# Patient Record
Sex: MALE | Race: ASIAN | NOT HISPANIC OR LATINO | Employment: FULL TIME | URBAN - METROPOLITAN AREA
[De-identification: names, ages, dates, MRNs, and addresses within clinical notes are randomized per-mention and may not be internally consistent; named-entity substitution may affect disease eponyms.]

---

## 2019-01-29 ENCOUNTER — COMMUNICATION - HEALTHEAST (OUTPATIENT)
Dept: SCHEDULING | Facility: CLINIC | Age: 44
End: 2019-01-29

## 2020-11-03 ENCOUNTER — OFFICE VISIT - HEALTHEAST (OUTPATIENT)
Dept: FAMILY MEDICINE | Facility: CLINIC | Age: 45
End: 2020-11-03

## 2020-11-03 DIAGNOSIS — R05.9 COUGH: ICD-10-CM

## 2020-11-03 DIAGNOSIS — U07.1 CLINICAL DIAGNOSIS OF COVID-19: ICD-10-CM

## 2020-11-03 RX ORDER — BENZONATATE 100 MG/1
100 CAPSULE ORAL 3 TIMES DAILY PRN
Qty: 30 CAPSULE | Refills: 0 | Status: SHIPPED | OUTPATIENT
Start: 2020-11-03 | End: 2021-10-11

## 2020-11-06 ENCOUNTER — COMMUNICATION - HEALTHEAST (OUTPATIENT)
Dept: FAMILY MEDICINE | Facility: CLINIC | Age: 45
End: 2020-11-06

## 2021-03-04 ENCOUNTER — OFFICE VISIT - HEALTHEAST (OUTPATIENT)
Dept: FAMILY MEDICINE | Facility: CLINIC | Age: 46
End: 2021-03-04

## 2021-03-04 DIAGNOSIS — M54.50 LUMBAR BACK PAIN: ICD-10-CM

## 2021-03-04 RX ORDER — CELECOXIB 200 MG/1
200 CAPSULE ORAL 2 TIMES DAILY
Qty: 60 CAPSULE | Refills: 2 | Status: SHIPPED | OUTPATIENT
Start: 2021-03-04 | End: 2021-10-11

## 2021-03-05 ENCOUNTER — COMMUNICATION - HEALTHEAST (OUTPATIENT)
Dept: FAMILY MEDICINE | Facility: CLINIC | Age: 46
End: 2021-03-05

## 2021-03-05 ENCOUNTER — COMMUNICATION - HEALTHEAST (OUTPATIENT)
Dept: ADMINISTRATIVE | Facility: CLINIC | Age: 46
End: 2021-03-05

## 2021-06-12 NOTE — PROGRESS NOTES
"Lucy Lee is a 45 y.o. male who is being evaluated via a billable telephone visit.      The patient has been notified of following:     \"This telephone visit will be conducted via a call between you and your physician/provider. We have found that certain health care needs can be provided without the need for a physical exam.  This service lets us provide the care you need with a short phone conversation.  If a prescription is necessary we can send it directly to your pharmacy.  If lab work is needed we can place an order for that and you can then stop by our lab to have the test done at a later time.    Telephone visits are billed at different rates depending on your insurance coverage. During this emergency period, for some insurers they may be billed the same as an in-person visit.  Please reach out to your insurance provider with any questions.    If during the course of the call the physician/provider feels a telephone visit is not appropriate, you will not be charged for this service.\"    Patient has given verbal consent to a Telephone visit? Yes    What phone number would you like to be contacted at? [phone]    Patient would like to receive their AVS by AVS Preference: Mail a copy.    Additional provider notes: 45-year-old male diagnosed with COVID-19.  Has been off work for 8 days and still coughing and feels fatigue would like to go back to work next week.  He notes no nausea no dizziness appetite is normal he has not noticed a fever.  He does cough during the day and at night which disturbs his sleep.  He requires a work note so he can return next week.  He has been isolating himself at home.    Assessment/Plan:  1. Cough    Medication prescribed side effects discussed in detail  - benzonatate (TESSALON PERLES) 100 MG capsule; Take 1 capsule (100 mg total) by mouth 3 (three) times a day as needed for cough.  Dispense: 30 capsule; Refill: 0    2. Clinical diagnosis of COVID-19    Work note will be " given if he develops any shortness of breath or wheezing or diarrhea he needs to go to the emergency room immediately    10:33 AM phone start time  10:51 AM phone end time    Phone call duration: 18  minutes    Carrol OROURKE

## 2021-06-12 NOTE — TELEPHONE ENCOUNTER
Who is requesting the letter?  Lucy king  Why is the letter needed? To return to work, he was told that it was going to be mailed out to him but he never received it and now he needs to have it today.  How would you like this letter returned? He would like to come in today and get it.  Okay to leave a detailed message? Yes, please call them when it is ready for him to  today

## 2021-06-15 NOTE — TELEPHONE ENCOUNTER
Please see below message.  Can CMT ad date of 3/8/21 to letter?  Thanks.     March 4, 2021      Patient: Lucy Lee   YOB: 1975   Date of Visit: 3/4/2021         To Whom It May Concern:     It is my medical opinion that Lucy Lee should remind out until 3/5/202. Please excuse his absences from 3/4/2021-3/5/2021.      If you have any questions or concerns, please don't hesitate to call.     Sincerely,           Electronically signed by Evin Abarca MD

## 2021-06-15 NOTE — TELEPHONE ENCOUNTER
Reason for Call:  Other : return to work letter     Detailed comments: Patient received return to work letter via [employer] but needs additional return date 3/8/21. Can Dr. Abarca add this to the letter? Otherwise, his employer won't let him go back to work. You can send him a [employer] message once letter is update.    Phone Number Patient can be reached at: Other phone number:  [phone]     Best Time: anytime2    Can we leave a detailed message on this number?: Yes    Call taken on 3/5/2021 at 12:53 PM by Ivonne Rick

## 2021-06-15 NOTE — PROGRESS NOTES
Lucy Lee is a 45 y.o. male who is being evaluated via a billable video visit.      How would you like to obtain your AVS? MyChart.  If dropped from the video visit, the video invitation should be resent by: [phone]  Will anyone else be joining your video visit? No      Video Start Time: 1230    Assessment & Plan     Lumbar back pain  Patient can continue with muscle relaxant Celebrex prescribed work note given.  Side effects of medication discussed in detail if symptoms continue follow-up recommended in 3 to 4 weeks.  All test results from ER visit discussed with patient  - celecoxib (CELEBREX) 200 MG capsule  Dispense: 60 capsule; Refill: 2    Evin Abarca MD  [facility]   Lucy Lee is 45 y.o. and presents today for a follow-up of his back pain on the virtual billable video visit.  Patient was seen [facility] for back pain because he is having referred groin pain multiple tests were done for the patient.  He reports that the tenacity and is helping with the back pain but only for a few hours he says his back is stiff in the morning his pain throughout the day.  He says he has not been able to go to work because of this pain.  He denies any abdominal complaints.  He denies any dizziness.  He states that he has not had this pain before.  Review of Systems   Musculoskeletal positive for mid thoracic and lumbar back pain with no radiation      Objective       Vitals:  No vitals were obtained today due to virtual visit.    Physical Exam  Interactive male sitting comfortably in his home in no acute distress  Musculoskeletal system no tenderness elicited when he presses his thoracic spine  He has difficulty flexing his lumbar spine beyond 40 degrees at L3 and L4.        Video-Visit Details    Type of service:  Video Visit    Video End Time (time video stopped): 100  Originating Location (pt. Location): Home    Distant Location (provider location):  [facility]     Platform used for Video Visit: Cesaar Branham MD

## 2021-06-21 NOTE — LETTER
Letter by Evin Abarca MD at      Author: Evin Abarca MD Service: -- Author Type: --    Filed:  Encounter Date: 3/4/2021 Status: (Other)         March 4, 2021     Patient: Lucy Lee   YOB: 1975   Date of Visit: 3/4/2021       To Whom It May Concern:    It is my medical opinion that Lucy Lee should remind out until 3/5/202. Please excuse his absences from 3/4/2021-3/5/2021.     If you have any questions or concerns, please don't hesitate to call.    Sincerely,        Electronically signed by Evin Abarca MD

## 2021-06-21 NOTE — LETTER
Letter by Maricruz Schwab MA at      Author: Maricruz Schwab MA Service: -- Author Type: --    Filed:  Encounter Date: 3/5/2021 Status: (Other)         March 4, 2021      Patient: Lucy Lee   YOB: 1975   Date of Visit: 3/4/2021         To Whom It May Concern:     It is my medical opinion that Lucy Lee should remind out until 3/8/202. Please excuse his absences from 3/4/2021-3/5/2021.      If you have any questions or concerns, please don't hesitate to call.     Sincerely,           Electronically signed by Evin Abarca MD

## 2021-06-21 NOTE — LETTER
Letter by Evin Abarca MD at      Author: Evin Abarca MD Service: -- Author Type: --    Filed:  Encounter Date: 11/3/2020 Status: (Other)         November 3, 2020     Patient: Lucy Lee   YOB: 1975   Date of Visit: 11/3/2020       To Whom It May Concern:    It is my medical opinion that Lucy Lee may return to work on November 9, 2020.  Please excuse his absence from work on October 21, 2020 - November 8, 2020.     If you have any questions or concerns, please don't hesitate to call.    Sincerely,        Electronically signed by Evin Abarca MD
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
## 2021-06-27 ENCOUNTER — HEALTH MAINTENANCE LETTER (OUTPATIENT)
Age: 46
End: 2021-06-27

## 2021-10-11 ENCOUNTER — APPOINTMENT (OUTPATIENT)
Dept: INTERPRETER SERVICES | Facility: CLINIC | Age: 46
End: 2021-10-11
Payer: COMMERCIAL

## 2021-10-11 ENCOUNTER — OFFICE VISIT (OUTPATIENT)
Dept: FAMILY MEDICINE | Facility: CLINIC | Age: 46
End: 2021-10-11
Payer: COMMERCIAL

## 2021-10-11 VITALS
TEMPERATURE: 97.6 F | RESPIRATION RATE: 16 BRPM | WEIGHT: 113.7 LBS | SYSTOLIC BLOOD PRESSURE: 114 MMHG | OXYGEN SATURATION: 96 % | HEART RATE: 61 BPM | DIASTOLIC BLOOD PRESSURE: 71 MMHG

## 2021-10-11 DIAGNOSIS — M62.838 MUSCLE SPASMS OF NECK: Primary | ICD-10-CM

## 2021-10-11 PROCEDURE — 99213 OFFICE O/P EST LOW 20 MIN: CPT | Performed by: NURSE PRACTITIONER

## 2021-10-11 RX ORDER — CYCLOBENZAPRINE HCL 10 MG
10 TABLET ORAL
Qty: 14 TABLET | Refills: 0 | Status: SHIPPED | OUTPATIENT
Start: 2021-10-11

## 2021-10-11 RX ORDER — NAPROXEN 500 MG/1
500 TABLET ORAL 2 TIMES DAILY PRN
Qty: 28 TABLET | Refills: 0 | Status: SHIPPED | OUTPATIENT
Start: 2021-10-11 | End: 2021-10-25

## 2021-10-11 ASSESSMENT — ENCOUNTER SYMPTOMS
FEVER: 0
PARESTHESIAS: 0
HEADACHES: 0
CHILLS: 0
NUMBNESS: 0
DIZZINESS: 0

## 2021-10-11 NOTE — PATIENT INSTRUCTIONS
No ibuprofen. Tylenol ok.      Naproxen twice daily for pain     Warm pack to the neck up to 1 time per hour.  Warm wet towel or heating paid.      Flexeril for muscle spasm at night - will make him sleepy.      Come back if weakness or not better and having difficulty working as a result in 1 week-10 days.

## 2021-10-11 NOTE — PROGRESS NOTES
Assessment & Plan     Muscle spasms of neck    - naproxen (NAPROSYN) 500 MG tablet  Dispense: 28 tablet; Refill: 0  - cyclobenzaprine (FLEXERIL) 10 MG tablet  Dispense: 14 tablet; Refill: 0     Point tenderness to left lateral neck.  Symptoms starting to improve since being 3 days ago.  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  Work note.  Warm packs.  Above medications for 14 days.  Recheck if not better in about a week, sooner if weakness, paresthesias, radiculopathy discussed.            Return in about 1 week (around 10/18/2021) for If no better.    Linda Ibarra, CNP  [facility]    Bing Betancourt is a 46 year old male who presents to clinic today for the following health issues:  Chief Complaint   Patient presents with     Neck Pain     daughter started massaging neck on saturday causing muscle spasms, having bad neck pain ever since      HPI    3 days ago.  Turned around to grab something and suddenly started hurting.      Left sided lateral neck pain pain.  Was unable to turn head yesterday. A little better today.      No weakness.  No radiculopathy.      Works driving a car. Couldn't drive due to inability to look over shoulder.      Pain 5/10.        Review of Systems   Constitutional: Negative for chills and fever.   Neurological: Negative for dizziness, numbness, headaches and paresthesias.           Objective    /71 (BP Location: Right arm, Patient Position: Sitting, Cuff Size: Adult Regular)   Pulse 61   Temp 97.6  F (36.4  C) (Oral)   Resp 16   Wt 51.6 kg (113 lb 11.2 oz)   SpO2 96%   Physical Exam  Constitutional:       Appearance: Normal appearance.   Neck:      Comments: Mildly reduced ROM in all directions   Musculoskeletal:      Cervical back: Tenderness (point tenderness base of skull on left ) present.   Neurological:      Mental Status: He is alert.   Psychiatric:         Mood and Affect: Mood normal.          Behavior: Behavior normal.         Thought Content: Thought content normal.         Judgment: Judgment normal.

## 2021-10-17 ENCOUNTER — HEALTH MAINTENANCE LETTER (OUTPATIENT)
Age: 46
End: 2021-10-17

## 2021-12-28 ENCOUNTER — IMMUNIZATION (OUTPATIENT)
Dept: NURSING | Facility: CLINIC | Age: 46
End: 2021-12-28
Payer: COMMERCIAL

## 2021-12-28 PROCEDURE — 91306 COVID-19,PF,MODERNA (18+ YRS BOOSTER .25ML): CPT

## 2021-12-28 PROCEDURE — 0064A COVID-19,PF,MODERNA (18+ YRS BOOSTER .25ML): CPT

## 2022-07-24 ENCOUNTER — HEALTH MAINTENANCE LETTER (OUTPATIENT)
Age: 47
End: 2022-07-24

## 2022-10-02 ENCOUNTER — HEALTH MAINTENANCE LETTER (OUTPATIENT)
Age: 47
End: 2022-10-02

## 2023-08-12 ENCOUNTER — HEALTH MAINTENANCE LETTER (OUTPATIENT)
Age: 48
End: 2023-08-12

## 2024-03-11 ENCOUNTER — OFFICE VISIT (OUTPATIENT)
Dept: FAMILY MEDICINE | Facility: CLINIC | Age: 49
End: 2024-03-11
Payer: COMMERCIAL

## 2024-03-11 VITALS
DIASTOLIC BLOOD PRESSURE: 74 MMHG | WEIGHT: 155 LBS | TEMPERATURE: 100.9 F | RESPIRATION RATE: 16 BRPM | SYSTOLIC BLOOD PRESSURE: 110 MMHG | HEART RATE: 102 BPM | OXYGEN SATURATION: 94 %

## 2024-03-11 DIAGNOSIS — R50.9 FEVER, UNSPECIFIED: ICD-10-CM

## 2024-03-11 DIAGNOSIS — J10.1 INFLUENZA A: Primary | ICD-10-CM

## 2024-03-11 LAB
FLUAV AG SPEC QL IA: POSITIVE
FLUBV AG SPEC QL IA: NEGATIVE

## 2024-03-11 PROCEDURE — 99213 OFFICE O/P EST LOW 20 MIN: CPT | Performed by: PHYSICIAN ASSISTANT

## 2024-03-11 PROCEDURE — 87804 INFLUENZA ASSAY W/OPTIC: CPT | Performed by: PHYSICIAN ASSISTANT

## 2024-03-11 PROCEDURE — 87635 SARS-COV-2 COVID-19 AMP PRB: CPT | Performed by: PHYSICIAN ASSISTANT

## 2024-03-11 RX ORDER — BENZONATATE 200 MG/1
200 CAPSULE ORAL 3 TIMES DAILY PRN
Qty: 30 CAPSULE | Refills: 0 | Status: SHIPPED | OUTPATIENT
Start: 2024-03-11

## 2024-03-11 RX ORDER — OSELTAMIVIR PHOSPHATE 75 MG/1
75 CAPSULE ORAL 2 TIMES DAILY
Qty: 10 CAPSULE | Refills: 0 | Status: SHIPPED | OUTPATIENT
Start: 2024-03-11 | End: 2024-03-16

## 2024-03-11 NOTE — PROGRESS NOTES
Assessment & Plan     Influenza A  Tamiflu as ordered.   Push fluids, rest and ibuprofen or tylenol for comfort.    RTC for persistent or worsening sx.   PI given and discussed.    - oseltamivir (TAMIFLU) 75 MG capsule  Dispense: 10 capsule; Refill: 0  - benzonatate (TESSALON) 200 MG capsule  Dispense: 30 capsule; Refill: 0    Fever, unspecified    - Influenza A & B Antigen - Clinic Collect  - Symptomatic COVID-19 Virus (Coronavirus) by PCR Nose         Kayleen Rodriguez PA-C  [facility]    Bing Betancourt is a 48 year old male who presents to clinic today for the following health issues:  Chief Complaint   Patient presents with    Cold Symptoms     Sx started Saturday. Headache. Fever. Chills. Cough. No sore throat. Sweating. Runny nose. Bodyache.     HPI  Pt presents with concern re: fever, HA, cold and cough and muscle pain x 48 hours.   No nausea, vomiting.   No ST.    Exposures: every one at home ill with similar.   No sob, chest pain, difficulty breathing.    No rash.    Taking fluids well.          Review of Systems  Constitutional, HEENT, cardiovascular, pulmonary, gi and gu systems are negative, except as otherwise noted.      Objective    /74   Pulse 102   Temp (!) 100.9  F (38.3  C) (Oral)   Resp 16   Wt 70.3 kg (155 lb)   SpO2 94%   Physical Exam   Pt is in no acute distress and appears well but fatigued.  Ears patent B:  TM s intact, non-injected. All land marks easily visibile    Nasal mucosa is non-edematous, no discharge.    Pharynx: non erythematous, tonsils non hypertrophied, No exudate   Neck supple: no adenopathy  Lungs: CTA  Heart: RRR, no murmur, no thrills or heaves   Ext: no edema  Skin: no rashes    Results for orders placed or performed in visit on 03/11/24   Symptomatic COVID-19 Virus (Coronavirus) by PCR Nose     Status: Normal    Specimen: Nose; Swab   Result Value Ref Range    SARS CoV2 PCR Negative Negative    Narrative    Testing was  performed using the darlyn SARS-CoV-2 assay on the darlyn  [employer] System. This test should be ordered for the detection of  SARS-CoV-2 in individuals who meet SARS-CoV-2 clinical and/or  epidemiological criteria. Test performance is unknown in asymptomatic  patients. This test is for in vitro diagnostic use under the FDA EUA  for laboratories certified under CLIA to perform high and/or moderate  complexity testing. This test has not been FDA cleared or approved. A  negative result does not rule out the presence of PCR inhibitors in  the specimen or target RNA in concentration below the limit of  detection for the assay. The possibility of a false negative should  be considered if the patient's recent exposure or clinical  presentation suggests COVID-19. This test was validated by the [facility]. This  laboratory is certified under the Clinical Laboratory Improvement  Amendments of 1988 (CLIA-88) as qualified to perform high and/or  moderate complexity laboratory testing.   Influenza A & B Antigen - Clinic Collect     Status: Abnormal    Specimen: Nose; Swab   Result Value Ref Range    Influenza A antigen Positive (A) Negative    Influenza B antigen Negative Negative    Narrative    Test results must be correlated with clinical data. If necessary, results should be confirmed by a molecular assay or viral culture.

## 2024-03-11 NOTE — LETTER
March 11, 2024      Lucy Lee  [address] [place] [address]        To Whom It May Concern:    Lucy Lee was seen in our clinic due to illness today thus unable to attend work.  He may return when fever free for 24 hours which may take 5-7 days.      Sincerely,      Kayleen Rodriguez

## 2024-03-12 LAB — SARS-COV-2 RNA RESP QL NAA+PROBE: NEGATIVE

## 2024-07-21 ENCOUNTER — OFFICE VISIT (OUTPATIENT)
Dept: FAMILY MEDICINE | Facility: CLINIC | Age: 49
End: 2024-07-21
Payer: COMMERCIAL

## 2024-07-21 VITALS
OXYGEN SATURATION: 95 % | HEART RATE: 98 BPM | RESPIRATION RATE: 15 BRPM | BODY MASS INDEX: 25.83 KG/M2 | DIASTOLIC BLOOD PRESSURE: 62 MMHG | WEIGHT: 155 LBS | TEMPERATURE: 98.4 F | SYSTOLIC BLOOD PRESSURE: 95 MMHG | HEIGHT: 65 IN

## 2024-07-21 DIAGNOSIS — R10.84 ABDOMINAL PAIN, GENERALIZED: ICD-10-CM

## 2024-07-21 DIAGNOSIS — R19.7 DIARRHEA, UNSPECIFIED TYPE: Primary | ICD-10-CM

## 2024-07-21 PROCEDURE — 99213 OFFICE O/P EST LOW 20 MIN: CPT

## 2024-07-21 RX ORDER — AZITHROMYCIN 500 MG/1
500 TABLET, FILM COATED ORAL DAILY
Qty: 3 TABLET | Refills: 0 | Status: SHIPPED | OUTPATIENT
Start: 2024-07-21 | End: 2024-07-21

## 2024-07-21 RX ORDER — AZITHROMYCIN 500 MG/1
500 TABLET, FILM COATED ORAL DAILY
Qty: 3 TABLET | Refills: 0 | Status: SHIPPED | OUTPATIENT
Start: 2024-07-21

## 2024-07-21 NOTE — PROGRESS NOTES
Assessment & Plan     Diarrhea, unspecified type  Abdominal pain, generalized  Suspect most likely due to acute onset of symptoms and relatively benign abdominal exam acute viral or bacterial gastroenteritis, less likely cystitis or obstruction or  etiology given lack of urinary symptoms.  Appear dry on exam, counseled to increase fluid intake and will empirically provide azithromycin while awaiting stool panel.  Return precautions provided in patient instructions.  - azithromycin (ZITHROMAX) 500 MG tablet  Dispense: 3 tablet; Refill: 0  - Enteric Bacteria and Virus Panel by JACKLYN Stool      Return if symptoms worsen or fail to improve, for Follow up.    Evens Ngo DO  [facility] FERMÍN Betancourt is a 49 year old male who presents to clinic today for the following health issues:  Chief Complaint   Patient presents with    Fatigue    Nausea, Vomiting, & Diarrhea     Since last night      HPI    49-year-old male presenting to the clinic with 1 day of nausea, diarrhea, generalized abdominal pain.    He states that 2 to 3 hours after having dinner last night he began to feel generalized abdominal pain and had loose stools.  Stools are liquid, yellow and dark color but denies bloody or melanotic stools.  Only experiencing generalized abdominal pain while having a bowel movement.  In the last 12 hours suspect that he has had approximately 10 small loose bowel movements.  Feels fatigued as well.  No vomiting.  Feeling subjectively warm and chilled at home this morning.  No one else at home is feeling similar symptoms.  Last bowel movement was prior to presenting to urgent care.  He was able to tolerate some rice and water today.    He works in  and is requesting a work note given his diarrheal illness.    Review of Systems  Constitutional, HEENT, cardiovascular, pulmonary, gi and gu systems are negative, except as otherwise noted.      Objective    BP 95/62   Pulse 98    "Temp 98.4  F (36.9  C) (Oral)   Resp 15   Ht 1.651 m (5' 5\")   Wt 70.3 kg (155 lb)   SpO2 95%   BMI 25.79 kg/m    Physical Exam   GENERAL: alert and no distress  HEENT: Dry mucous membranes.  NECK: no adenopathy, no asymmetry, masses, or scars  RESP: lungs clear to auscultation - no rales, rhonchi or wheezes  CV: regular rate and rhythm, normal S1 S2, no S3 or S4, no murmur, click or rub, no peripheral edema  ABDOMEN: Soft, nontender, no rebound tenderness or guarding or rigidity.  Negative Floyd's sign, negative McBurney's point tenderness, no CVA tenderness, no peritoneal signs.  MS: no gross musculoskeletal defects noted, no edema        "

## 2024-07-21 NOTE — LETTER
July 21, 2024      Lucy Lee  [address] [place] [address]        To Whom It May Concern:    Lucy Lee  was seen on 7/21/24.  Please excuse him  until 7/25/24 due to illness. He is unable to participate in any activities related to  during this time.        Sincerely,        Evens Ngo, DO

## 2024-07-21 NOTE — PATIENT INSTRUCTIONS
Thank you for visiting our clinic today. As we discussed;    1) If you begin feeling worsening abdominal pain, fevers, chills, inability to have a bowel movement or pass gas, then please be seen in the ER    2) Continue to drink plenty of fluids     3) Please be re-evaluated if you are not feeling improved in the next 24-48hrs    Please feel free to call the clinic with any questions or concerns or send a [employer] message and we will get back to you as soon as we can.

## 2024-07-22 ENCOUNTER — TELEPHONE (OUTPATIENT)
Dept: FAMILY MEDICINE | Facility: CLINIC | Age: 49
End: 2024-07-22
Payer: COMMERCIAL

## 2024-07-22 PROCEDURE — 87507 IADNA-DNA/RNA PROBE TQ 12-25: CPT

## 2024-07-23 ENCOUNTER — TELEPHONE (OUTPATIENT)
Dept: URGENT CARE | Facility: URGENT CARE | Age: 49
End: 2024-07-23
Payer: COMMERCIAL

## 2024-07-24 ENCOUNTER — TELEPHONE (OUTPATIENT)
Dept: FAMILY MEDICINE | Facility: CLINIC | Age: 49
End: 2024-07-24

## 2024-07-24 ENCOUNTER — OFFICE VISIT (OUTPATIENT)
Dept: FAMILY MEDICINE | Facility: CLINIC | Age: 49
End: 2024-07-24
Payer: COMMERCIAL

## 2024-07-24 VITALS
HEART RATE: 60 BPM | BODY MASS INDEX: 25.83 KG/M2 | TEMPERATURE: 98.7 F | HEIGHT: 65 IN | SYSTOLIC BLOOD PRESSURE: 122 MMHG | DIASTOLIC BLOOD PRESSURE: 81 MMHG | RESPIRATION RATE: 18 BRPM | OXYGEN SATURATION: 98 % | WEIGHT: 155 LBS

## 2024-07-24 DIAGNOSIS — A09 DIARRHEA OF INFECTIOUS ORIGIN: Primary | ICD-10-CM

## 2024-07-24 LAB
ALBUMIN UR-MCNC: NEGATIVE MG/DL
APPEARANCE UR: CLEAR
BACTERIA #/AREA URNS HPF: ABNORMAL /HPF
BILIRUB UR QL STRIP: NEGATIVE
COLOR UR AUTO: YELLOW
GLUCOSE UR STRIP-MCNC: NEGATIVE MG/DL
HGB UR QL STRIP: ABNORMAL
KETONES UR STRIP-MCNC: NEGATIVE MG/DL
LEUKOCYTE ESTERASE UR QL STRIP: NEGATIVE
NITRATE UR QL: NEGATIVE
PH UR STRIP: 6 [PH] (ref 5–8)
RBC #/AREA URNS AUTO: ABNORMAL /HPF
SP GR UR STRIP: <=1.005 (ref 1–1.03)
SQUAMOUS #/AREA URNS AUTO: ABNORMAL /LPF
UROBILINOGEN UR STRIP-ACNC: 0.2 E.U./DL
WBC #/AREA URNS AUTO: ABNORMAL /HPF

## 2024-07-24 PROCEDURE — 99213 OFFICE O/P EST LOW 20 MIN: CPT | Performed by: FAMILY MEDICINE

## 2024-07-24 PROCEDURE — 81001 URINALYSIS AUTO W/SCOPE: CPT | Performed by: FAMILY MEDICINE

## 2024-07-24 RX ORDER — AZITHROMYCIN 500 MG/1
500 TABLET, FILM COATED ORAL DAILY
Qty: 3 TABLET | Refills: 0 | Status: CANCELLED | OUTPATIENT
Start: 2024-07-24

## 2024-07-24 RX ORDER — DICYCLOMINE HYDROCHLORIDE 10 MG/1
10 CAPSULE ORAL
Qty: 30 CAPSULE | Refills: 0 | Status: SHIPPED | OUTPATIENT
Start: 2024-07-24

## 2024-07-24 ASSESSMENT — PAIN SCALES - GENERAL: PAINLEVEL: MILD PAIN (3)

## 2024-07-24 NOTE — TELEPHONE ENCOUNTER
"LMTCB using a [employer]  regarding pt 7/22 lab results.-Please see message below from provider.    \"Please let Nu know there is blood in his urine. It's not a concerning amount, but he should monitor for changes in his urine. If there are changes, please have him notify us or go to urgent care.\"    FRANKIE Harris.  "

## 2024-07-24 NOTE — RESULT ENCOUNTER NOTE
Please let Nu know there is blood in his urine. It's not a concerning amount, but he should monitor for changes in his urine. If there are changes, please have him notify us or go to urgent care.

## 2024-07-24 NOTE — LETTER
July 24, 2024      Lucy Lee  [address] [place] [address]        To Whom It May Concern:    Lucy Lee  was seen on 07/24/2024.  Please excuse him  until 07/30/2024 due to illness.        Sincerely,        RENAE LR DO

## 2024-07-24 NOTE — TELEPHONE ENCOUNTER
LMTCB.     Patient's stool studies as follows. No change to treatment plan. These infections are generally self limiting and go away on their own.     Results for orders placed or performed in visit on 07/21/24 (from the past 48 hour(s))   Enteric Bacteria and Virus Panel by JACKLYN Stool    Specimen: Per Rectum; Stool   Result Value Ref Range    Campylobacter species Negative Negative    Salmonella species Positive (A) Negative    Vibrio species Negative Negative    Vibrio cholerae Negative Negative    Yersinia enterocolitica Negative Negative    Enteropathogenic E. coli (EPEC) NA Negative, NA    Shiga-like toxin-producing E. coli (STEC) Positive (A) Negative    Shigella/Enteroinvasive E. coli (EIEC) Negative Negative    Cryptosporidium species Negative Negative    Giardia lamblia Negative Negative    Norovirus Gl/Gll Negative Negative    Rotavirus A Negative Negative    Plesiomonas shigelloides Negative Negative    Enteroaggregative E. coli (EAEC) Negative Negative    Enterotoxigenic E. coli (ETEC) Positive (A) Negative    E. coli O157 Positive (A) Negative, NA    Cyclospora cayetanensis Negative Negative    Entamoeba histolytica Negative Negative    Adenovirus F40/41 Negative Negative    Astrovirus Negative Negative    Sapovirus Negative Negative    Narrative    Assay performed using the FDA-cleared FilmArray GI Panel from [employer]  A negative result should not rule out infection in patients with a probability for gastrointestinal infection. The assay does not test for all potential infectious agents of diarrheal disease.  Positive results do not distinguish between a viable or replicating organism and the presence of a nonviable organism or nucleic acid, nor do they exclude the possibility of coinfection by organisms not in the panel.  Results are intended to aid in the diagnosis of illness and are meant to be used in conjunction with other clinical findings.  This test has been verified and is  performed by the Infectious Diseases Diagnostic Laboratory at [facility]. This laboratory is certified under the Clinical Laboratory Improvement Amendments of 1988 (CLIA-88) as qualified to perform high complexity clinical laboratory testing.

## 2024-07-24 NOTE — PROGRESS NOTES
"  Assessment & Plan     Diarrhea of infectious origin  Acute, improving. Patient with mild dehydration on exam, encouraged oral hydration. Work note extended. Given bentyl for abdominal spasm pain for symptom control.   - UA Macroscopic with reflex to Microscopic and Culture - Clinic Collect  - dicyclomine (BENTYL) 10 MG capsule  Dispense: 30 capsule; Refill: 0  - UA Microscopic with Reflex to Culture          MED REC REQUIRED  Post Medication Reconciliation Status:  Patient was not discharged from an inpatient facility or TCU  BMI  Estimated body mass index is 25.79 kg/m  as calculated from the following:    Height as of this encounter: 1.651 m (5' 5\").    Weight as of this encounter: 70.3 kg (155 lb).   Weight management plan: Discussed healthy diet and exercise guidelines      See Patient Instructions    Subjective   Lucy Betancourt is a 48 year old, presenting for the following health issues:  ER F/U        7/24/2024     2:49 PM   Additional Questions   Roomed by NOAM Flowers   Accompanied by Son     HPI     ED/UC Followup:    Facility:  [facility]   Date of visit: 07/21/2024  Reason for visit: Fatigue  Nausea, Vomiting, & Diarrhea   Current Status: Pt states that symptoms have slightly increased, pt is still feeling fatigue and hasn't been able to eat much since the visit. Pt also states that he is feeling dizzy. Pt states that it feels like his stomach is \"tight\"     Patient reports that his symptoms are slightly better, but still having a tight feeling and significant fatigue.     This morning was able to eat an apple but had a \"stuck feeling\". Patient feels dizzy with walking.       Review of Systems  Constitutional, HEENT, cardiovascular, pulmonary, gi and gu systems are negative, except as otherwise noted.      Objective    /81 (BP Location: Right arm, Patient Position: Sitting, Cuff Size: Adult Regular)   Pulse 60   Temp 98.7  F (37.1  C) (Oral)   Resp 18   Ht 1.651 m (5' " "5\")   Wt 70.3 kg (155 lb)   SpO2 98%   BMI 25.79 kg/m    Body mass index is 25.79 kg/m .  Physical Exam   GENERAL: alert and no distress  EYES: Eyes grossly normal to inspection, PERRL and conjunctivae and sclerae normal  NECK: no adenopathy, no asymmetry, masses, or scars  MS: no gross musculoskeletal defects noted, no edema  SKIN: no suspicious lesions or rashes    Office Visit on 07/21/2024   Component Date Value Ref Range Status    Campylobacter species 07/22/2024 Negative  Negative Final    Salmonella species 07/22/2024 Positive (A)  Negative Final    Vibrio species 07/22/2024 Negative  Negative Final    Vibrio cholerae 07/22/2024 Negative  Negative Final    Yersinia enterocolitica 07/22/2024 Negative  Negative Final    Enteropathogenic E. coli (EPEC) 07/22/2024 NA  Negative, NA Final    Shiga-like toxin-producing E. coli* 07/22/2024 Positive (A)  Negative Final    Shigella/Enteroinvasive E. coli (E* 07/22/2024 Negative  Negative Final    Cryptosporidium species 07/22/2024 Negative  Negative Final    Giardia lamblia 07/22/2024 Negative  Negative Final    Norovirus Gl/Gll 07/22/2024 Negative  Negative Final    Rotavirus A 07/22/2024 Negative  Negative Final    Plesiomonas shigelloides 07/22/2024 Negative  Negative Final    Enteroaggregative E. coli (EAEC) 07/22/2024 Negative  Negative Final    Enterotoxigenic E. coli (ETEC) 07/22/2024 Positive (A)  Negative Final    E. coli O157 07/22/2024 Positive (A)  Negative, NA Final    Cyclospora cayetanensis 07/22/2024 Negative  Negative Final    Entamoeba histolytica 07/22/2024 Negative  Negative Final    Adenovirus F40/41 07/22/2024 Negative  Negative Final    Astrovirus 07/22/2024 Negative  Negative Final    Sapovirus 07/22/2024 Negative  Negative Final           Signed Electronically by: RENAE LR DO    "

## 2024-07-25 ENCOUNTER — TELEPHONE (OUTPATIENT)
Dept: FAMILY MEDICINE | Facility: CLINIC | Age: 49
End: 2024-07-25

## 2024-07-25 ENCOUNTER — APPOINTMENT (OUTPATIENT)
Dept: INTERPRETER SERVICES | Facility: CLINIC | Age: 49
End: 2024-07-25
Payer: COMMERCIAL

## 2024-07-25 NOTE — TELEPHONE ENCOUNTER
LMTCB with a phone interp.     If call is returned, please see result message below and relay to the patient.     Thank you     Result letter mailed as well.

## 2024-07-25 NOTE — TELEPHONE ENCOUNTER
----- Message from RENAE LR sent at 7/24/2024  4:19 PM CDT -----  Please let Nu know there is blood in his urine. It's not a concerning amount, but he should monitor for changes in his urine. If there are changes, please have him notify us or go to urgent care.

## 2024-07-25 NOTE — LETTER
July 25, 2024      Lucy Lee  [address] [place] [address]        Dear ,    We are writing to inform you of your test results.    Please let Nu know there is blood in his urine. It's not a concerning amount, but he should monitor for changes in his urine. If there are changes, please have him notify us or go to urgent care.     Resulted Orders   UA Macroscopic with reflex to Microscopic and Culture - Clinic Collect   Result Value Ref Range    Color Urine Yellow Colorless, Straw, Light Yellow, Yellow    Appearance Urine Clear Clear    Glucose Urine Negative Negative mg/dL    Bilirubin Urine Negative Negative    Ketones Urine Negative Negative mg/dL    Specific Gravity Urine <=1.005 1.005 - 1.030    Blood Urine Large (A) Negative    pH Urine 6.0 5.0 - 8.0    Protein Albumin Urine Negative Negative mg/dL    Urobilinogen Urine 0.2 0.2, 1.0 E.U./dL    Nitrite Urine Negative Negative    Leukocyte Esterase Urine Negative Negative   UA Microscopic with Reflex to Culture   Result Value Ref Range    Bacteria Urine Few (A) None Seen /HPF    RBC Urine 2-5 (A) 0-2 /HPF /HPF    WBC Urine None Seen 0-5 /HPF /HPF    Squamous Epithelials Urine Few (A) None Seen /LPF    Narrative    Urine Culture not indicated       If you have any questions or concerns, please call the clinic at the number listed above.       Sincerely,    Dr. Raymond's Care team

## 2024-08-10 LAB
ADV 40+41 DNA STL QL NAA+NON-PROBE: NEGATIVE
ASTRO TYP 1-8 RNA STL QL NAA+NON-PROBE: NEGATIVE
C CAYETANENSIS DNA STL QL NAA+NON-PROBE: NEGATIVE
CAMPYLOBACTER DNA SPEC NAA+PROBE: NEGATIVE
CRYPTOSP DNA STL QL NAA+NON-PROBE: NEGATIVE
E COLI O157 DNA STL QL NAA+NON-PROBE: POSITIVE
E HISTOLYT DNA STL QL NAA+NON-PROBE: NEGATIVE
EAEC ASTA GENE ISLT QL NAA+PROBE: NEGATIVE
EC STX1+STX2 GENES STL QL NAA+NON-PROBE: POSITIVE
EPEC EAE GENE STL QL NAA+NON-PROBE: ABNORMAL
ETEC LTA+ST1A+ST1B TOX ST NAA+NON-PROBE: POSITIVE
G LAMBLIA DNA STL QL NAA+NON-PROBE: NEGATIVE
NOROVIRUS GI+II RNA STL QL NAA+NON-PROBE: NEGATIVE
P SHIGELLOIDES DNA STL QL NAA+NON-PROBE: NEGATIVE
RVA RNA STL QL NAA+NON-PROBE: NEGATIVE
SALMONELLA SP RPOD STL QL NAA+PROBE: POSITIVE
SAPO I+II+IV+V RNA STL QL NAA+NON-PROBE: NEGATIVE
SHIGELLA SP+EIEC IPAH ST NAA+NON-PROBE: NEGATIVE
V CHOLERAE DNA SPEC QL NAA+PROBE: NEGATIVE
VIBRIO DNA SPEC NAA+PROBE: NEGATIVE
Y ENTEROCOL DNA STL QL NAA+PROBE: NEGATIVE

## 2024-10-05 ENCOUNTER — HEALTH MAINTENANCE LETTER (OUTPATIENT)
Age: 49
End: 2024-10-05